# Patient Record
Sex: FEMALE | URBAN - METROPOLITAN AREA
[De-identification: names, ages, dates, MRNs, and addresses within clinical notes are randomized per-mention and may not be internally consistent; named-entity substitution may affect disease eponyms.]

---

## 2023-12-30 ENCOUNTER — TELEMEDICINE (OUTPATIENT)
Dept: PRIMARY CARE | Facility: CLINIC | Age: 38
End: 2023-12-30
Payer: COMMERCIAL

## 2023-12-30 DIAGNOSIS — J01.00 ACUTE NON-RECURRENT MAXILLARY SINUSITIS: Primary | ICD-10-CM

## 2023-12-30 DIAGNOSIS — H10.33 ACUTE BACTERIAL CONJUNCTIVITIS OF BOTH EYES: ICD-10-CM

## 2023-12-30 PROCEDURE — 99212 OFFICE O/P EST SF 10 MIN: CPT | Performed by: FAMILY MEDICINE

## 2023-12-30 RX ORDER — METFORMIN HYDROCHLORIDE 500 MG/1
1000 TABLET, EXTENDED RELEASE ORAL
COMMUNITY
Start: 2016-09-26

## 2023-12-30 RX ORDER — AMPHETAMINE 15 MG/1
TABLET, EXTENDED RELEASE ORAL
COMMUNITY
Start: 2023-11-27

## 2023-12-30 RX ORDER — MEDROXYPROGESTERONE ACETATE 10 MG/1
10 TABLET ORAL
COMMUNITY
Start: 2023-12-12

## 2023-12-30 RX ORDER — FLUOXETINE HYDROCHLORIDE 40 MG/1
CAPSULE ORAL
COMMUNITY
Start: 2023-12-04

## 2023-12-30 RX ORDER — TOBRAMYCIN 3 MG/ML
2 SOLUTION/ DROPS OPHTHALMIC EVERY 4 HOURS
Qty: 5 ML | Refills: 0 | Status: SHIPPED | OUTPATIENT
Start: 2023-12-30 | End: 2024-01-06

## 2023-12-30 RX ORDER — AMOXICILLIN AND CLAVULANATE POTASSIUM 875; 125 MG/1; MG/1
1 TABLET, FILM COATED ORAL 2 TIMES DAILY
Qty: 14 TABLET | Refills: 0 | Status: SHIPPED | OUTPATIENT
Start: 2023-12-30 | End: 2024-01-06

## 2023-12-30 NOTE — PATIENT INSTRUCTIONS
Please send me a [employer] message if you have any questions or concerns.  FOR NON URGENT questions only.  Allow up to 72 hours for response.    If you have prescription issues or other questions you can email   Suzi Zamora  Digital Health Coordinator, at   [email or web address]       Sinusitis  Antibiotic as written--augmentin  Tobrex as written  HANDWASHING to prevent spread    Rest and drink plenty of fluids    Tylenol and or motrin as needed for pain and fever (unless you have been told not to take these because of your personal medical history)    Discussed options and precautions:   Viral versus bacterial infection; use of medications; possible side effects; appropriate over-the-counter medications; possible complications and /or when to follow-up.    Follow-up in 1 to 2 days if not improving.  Follow-up immediately if symptoms worsen.    All red flags requiring in person care were discussed.  All patient's questions were answered.    Limitations to telemedicine include inability to do a complete and accurate physical exam.  Any concerns regarding this were conveyed with the patient and in person follow-up recommended if patient nature of illness does not progress as anticipated during this visit.    Over-the-counter cold and cough medications    Take Mucinex for cough, drink plenty of fluids with this medication and will help break up congestion making it easier to cough up    For cough can use honey (children ages 1 and up) in hot tea or hot water. I recommend putting this in an insulated cup and carrying it around throughout the day to sip on.  Have it at your bedside at night in case you wake up coughing.  You can also use honey cough drops (adults and older children).    Recommend nasal saline for use in children and adults.  Neti Mark can also be helpful.  (Never used tap water and a Neti pot.  Use distilled water.)    If you have plugged up congested ears or the feeling of fluid in your  ears, you can use an over-the-counter nasal steroid spray like fluticasone (brand name Flonase) use 2 sprays into each nostril once or twice a day for 7 days.  This will help open up the eustachian tubes and allow the fluid to drain out of your ears.    Sleeping with your head/chest elevated can help with sinus drainage.    Adults only-can use nasal decongestant (like Afrin) at bedtime to open nasal passages so you can breathe through your nose while you sleep; avoid using for longer than 3 days as this medication can become addicting.  Do not use if you have high blood pressure or high heart rate.    For severe pain or fever in adult-Tylenol (2 extra strength) or ibuprofen (3-4 tabs equals 600 to 800 mg) alternating as needed for pain.  Tylenol doses should be 6 to 8 hours apart and ibuprofen doses should be 6 to 8 hours apart.      Common cold medications for adults explained:    **Cold medications for children are not recommended-only Tylenol, Motrin, honey (only for children older than 1 year), cool-mist humidifier, and nasal saline spray are recommended for children.    Mucinex-(generic name guaifenesin)-is an expectorant.  This will thin out all the thick mucus.  Must drink plenty of liquids for this medicine to work.    Dextromethorphan (brand name Delsym or DM)-this medicine is a cough suppressant    Honey in hot water or tea-this is a natural cough suppressant    Decongestant nasal spray- (eg: Afrin) use for temporary relief of nasal congestion-best when used at bedtime to open up nasal passages so that you are not forced to mouth breathe overnight.    Sudafed (generic name pseudoephedrine-this must be bought from the pharmacist) DO NOT use this medicine if you have high blood pressure as it can raise your blood pressure higher.  Do not use if you have any irregular heart rate.  This medicine can help clear congestion in your sinuses.

## 2023-12-30 NOTE — PROGRESS NOTES
"Chief Complaint: URI sxs  HPI:  cold sxs started 12/17 as regular \"cold\" RN cough then got better  Thursday night (today is Saturday) worse again specifically in eye--dripping-- Friday am one eye swollen shut  This am in both eyes  Now both eyes with yellow green discharge--gritty eyes  Still has sinus pressure  +sinus TTP maxillary  If bends over gets pressure--    Fever--no  Chills--no  Aches--no  Cough--no  Exhaustion--no  Sob or wheeze--no  Chest tightness--no  Sore throat--yes  Congestion--yes  RN/stuffy nose/PND--yes  Headache---sinus  Nausea--no  Vomiting--no  Diarrhea--no  Appetite--good  Fluids--good    OTC meds--      ALL OTHER ROS (-)    General appearance:  Vitals available from patient?No  Alert, oriented, pleasant, in no apparent distress Yes  Answers questions appropriatelyYes  Eyes clear?No  Is patient in respiratory distressNo    Throat exam Not Available  Is patient coughing during consult:No  Audible wheezing noted? :No  Pt sounds congested?: Yes  Sniffing or rhinorrhea?: Yes  Frontal sinus TTP by palpation? Yes  Maxillary sinus TTP by palpation?Yes  Psychiatric: Affect normalYes  Other relevant physical exam:      Pt location in [place] and consent obtained. Telemedicine appropriate evaluation completed. Appropriate physical exam done.      Sinusitis  Antibiotic as written--augmentin  Tobrex as written  HANDWASHING to prevent spread    "

## 2025-07-30 ENCOUNTER — HOSPITAL ENCOUNTER (OUTPATIENT)
Dept: RADIOLOGY | Facility: CLINIC | Age: 40
Discharge: HOME | End: 2025-07-30
Payer: COMMERCIAL

## 2025-07-30 VITALS — HEIGHT: 62 IN | WEIGHT: 195 LBS | BODY MASS INDEX: 35.88 KG/M2

## 2025-07-30 DIAGNOSIS — Z12.31 SCREENING MAMMOGRAM FOR BREAST CANCER: ICD-10-CM

## 2025-07-30 PROCEDURE — 77063 BREAST TOMOSYNTHESIS BI: CPT

## 2025-07-30 PROCEDURE — 77063 BREAST TOMOSYNTHESIS BI: CPT | Performed by: RADIOLOGY

## 2025-07-30 PROCEDURE — 77067 SCR MAMMO BI INCL CAD: CPT | Performed by: RADIOLOGY

## 2025-08-04 ENCOUNTER — TELEPHONE (OUTPATIENT)
Dept: HEMATOLOGY/ONCOLOGY | Facility: HOSPITAL | Age: 40
End: 2025-08-04
Payer: COMMERCIAL

## 2025-08-04 NOTE — TELEPHONE ENCOUNTER
I spoke to the patient who is actually on vacation this week but is agreeable to a telephone visit so that additional imaging can be expedited. Will schedule her for Friday 8/8/25 at 1130am with Antionette Lopez NP.

## 2025-08-04 NOTE — TELEPHONE ENCOUNTER
Pt with referral to [facility] to arrange additional breast imaging after recent mammogram. Attempted to contact the patient to discuss scheduling telephone visit with Antionette Lopez NP on either 8/6/25 or 8/8/25 but reached VM. Left message asking patient to call the office back.

## 2025-08-07 PROBLEM — O35.9XX0 SUSPECTED FETAL ANOMALY, ANTEPARTUM (HHS-HCC): Status: ACTIVE | Noted: 2019-01-08

## 2025-08-07 PROBLEM — O44.02: Status: ACTIVE | Noted: 2018-10-24

## 2025-08-07 PROBLEM — O42.90 PROM (PREMATURE RUPTURE OF MEMBRANES) (HHS-HCC): Status: ACTIVE | Noted: 2019-02-11

## 2025-08-07 PROBLEM — M62.81 MUSCLE WEAKNESS: Status: ACTIVE | Noted: 2023-01-19

## 2025-08-07 PROBLEM — E28.2 PCOS (POLYCYSTIC OVARIAN SYNDROME): Status: ACTIVE | Noted: 2025-08-07

## 2025-08-07 PROBLEM — R41.840 DIFFICULTY CONCENTRATING: Status: ACTIVE | Noted: 2017-03-09

## 2025-08-07 PROBLEM — M54.50 MIDLINE LOW BACK PAIN: Status: ACTIVE | Noted: 2023-01-19

## 2025-08-07 RX ORDER — MUPIROCIN 20 MG/G
OINTMENT TOPICAL 2 TIMES DAILY
COMMUNITY
Start: 2024-08-12

## 2025-08-08 ENCOUNTER — TELEMEDICINE (OUTPATIENT)
Dept: HEMATOLOGY/ONCOLOGY | Facility: CLINIC | Age: 40
End: 2025-08-08
Payer: COMMERCIAL

## 2025-08-08 DIAGNOSIS — N63.11 MASS OF UPPER OUTER QUADRANT OF RIGHT BREAST: Primary | ICD-10-CM

## 2025-08-08 PROCEDURE — 1036F TOBACCO NON-USER: CPT | Performed by: NURSE PRACTITIONER

## 2025-08-08 PROCEDURE — 99203 OFFICE O/P NEW LOW 30 MIN: CPT | Performed by: NURSE PRACTITIONER

## 2025-08-08 NOTE — PROGRESS NOTES
[facility]  Diagnostic Clinic  New Patient Virtual Visit    Date of Service: 25      Patient Name: Tati Warner   : 1985  MRN: [id number]   PCP: No primary care provider on file.   Referred by: Mammogram RT    Problem: Bi-rads cat 0 mammogram    HPI: Tati Warner is a 40 y.o. year old female w/ PMH PCOS, ADHD, seasonal allergic rhinitis, thyroid disease, and vitamin D deficiency, who presents to [facility] with Bi-rads cat 0 mammogram result, referral placed by mammogram RT. Ms. Warner underwent self-referral screening mammogram on 25, which revealed possible sub-centimeter mass in upper-outer quadrant of the right breast, w/ recommendation for additional breast imaging for further evaluation. No mammographic evidence of malignancy in the left breast. This was her first screening mammogram. Not currently established w/ PCP.    Breast imaging:  FINDINGS:  2D and tomosynthesis images were reviewed at 1 mm slice thickness.      Density:  The breasts are almost entirely fatty.      A subcentimeter mass is questioned in the upper-outer quadrant of the  right breast at an anterior depth. This requires further evaluation  with a diagnostic mammogram and possible ultrasound evaluation. No  mammographic evidence of malignancy in the left breast.      IMPRESSION:  Right mass      BI-RADS CATEGORY:  BI-RADS Category:  0 Incomplete; Need Additional Imaging Evaluation  Recommendation:  Additional Imaging.  Recommended Date:  Immediate.  Laterality:  Right.        REPRODUCTIVE HISTORY:   Menarche age: 12 yrs old    First birth age: 33 yrs old  : 1 month  OCP use: No  Premenopausal    Prior mammogram: N/A    Prior breast biopsy: N/A    History of tobacco use:   Never.    Personal history of malignancy:   None.    Family history of breast malignancy:   None.    PAST MEDICAL HISTORY:  Medical History[1]    PAST SURGICAL HISTORY:  Surgical History[2]    SOCIAL  HISTORY:  Social Connections: Not on file       FAMILY HISTORY:  Family History[3]    MEDICATIONS:  Medications Ordered Prior to Encounter[4]      REVIEW OF SYSTEMS:  She denies palpable breast lump, asymmetry, breast skin changes, nipple discharge or bleeding, or axillary lymphadenopathy.    OBJECTIVE:  LMP  (LMP Unknown)   Exam deferred due to virtual visit.    ASSESSMENT:  Tati Warner is a 40 y.o. year old female w/ PMH PCOS, ADHD, seasonal allergic rhinitis, thyroid disease, and vitamin D deficiency, who presents to [facility] with Bi-rads cat 0 mammogram result 2/2 possible subcentimeter right breast mass.    PLAN:  1. Mass of upper outer quadrant of right breast (Primary)  Further right breast imaging needed for evaluation.  -- BI mammo right diagnostic tomosynthesis; Future  -- BI US breast limited right; Future   -- All patient questions answered. Pt provided ph# for breast imaging scheduling. I also placed a referral to primary care, for patient to establish w/ PCP, and asked the [facility] schedulers to reach out to patient to arrange appt.    Antionette Lopez APRN.CNP  [facility]          Virtual or Telephone Consent    While technically available, the patient was unable or unwilling to consent to connect via audio/video telehealth technology; therefore, I performed this visit using a real-time audio only connection between Tati Warner & PADMINI Aguirre.  Verbal consent was requested and obtained from Tati Warner on this date, 08/08/25 for a telehealth visit and the patient's location was confirmed at the time of the visit.           [1]   Past Medical History:  Diagnosis Date    Obesity 2009   [2] No past surgical history on file.  [3] No family history on file.  [4]   Current Outpatient Medications on File Prior to Visit   Medication Sig Dispense Refill    Dyanavel XR 15 mg tablet, IR - ER, biphasic 24hr 1 TABLET BY MOUTH 1 TIMES A DAY (IN THE MORNING)       FLUoxetine (PROzac) 40 mg capsule TAKE 1 CAPSULE BY MOUTH ONCE A DAY IN THE MORNING      medroxyPROGESTERone (Provera) 10 mg tablet Take 1 tablet (10 mg) by mouth once daily.      metFORMIN  mg 24 hr tablet Take 2 tablets (1,000 mg) by mouth.      mupirocin (Bactroban) 2 % ointment Apply topically 2 times a day. to affected area      VITAMIN B COMPLEX ORAL Take by mouth.       No current facility-administered medications on file prior to visit.

## 2025-08-14 ENCOUNTER — APPOINTMENT (OUTPATIENT)
Dept: RADIOLOGY | Facility: CLINIC | Age: 40
End: 2025-08-14
Payer: COMMERCIAL

## 2025-08-15 ENCOUNTER — RESULTS FOLLOW-UP (OUTPATIENT)
Dept: HEMATOLOGY/ONCOLOGY | Facility: HOSPITAL | Age: 40
End: 2025-08-15
Payer: COMMERCIAL

## 2025-08-15 ENCOUNTER — HOSPITAL ENCOUNTER (OUTPATIENT)
Dept: RADIOLOGY | Facility: CLINIC | Age: 40
Discharge: HOME | End: 2025-08-15
Payer: COMMERCIAL

## 2025-08-15 DIAGNOSIS — N63.11 MASS OF UPPER OUTER QUADRANT OF RIGHT BREAST: ICD-10-CM

## 2025-08-15 DIAGNOSIS — N63.11 MASS OF UPPER OUTER QUADRANT OF RIGHT BREAST: Primary | ICD-10-CM

## 2025-08-15 PROCEDURE — 76982 USE 1ST TARGET LESION: CPT

## 2025-08-15 PROCEDURE — 77061 BREAST TOMOSYNTHESIS UNI: CPT | Mod: RIGHT SIDE | Performed by: STUDENT IN AN ORGANIZED HEALTH CARE EDUCATION/TRAINING PROGRAM

## 2025-08-15 PROCEDURE — 77065 DX MAMMO INCL CAD UNI: CPT | Mod: RIGHT SIDE | Performed by: STUDENT IN AN ORGANIZED HEALTH CARE EDUCATION/TRAINING PROGRAM

## 2025-08-15 PROCEDURE — 77061 BREAST TOMOSYNTHESIS UNI: CPT | Mod: RT

## 2025-08-15 PROCEDURE — 76642 ULTRASOUND BREAST LIMITED: CPT | Mod: RIGHT SIDE | Performed by: STUDENT IN AN ORGANIZED HEALTH CARE EDUCATION/TRAINING PROGRAM

## 2025-08-15 PROCEDURE — 76642 ULTRASOUND BREAST LIMITED: CPT | Mod: RT
